# Patient Record
(demographics unavailable — no encounter records)

---

## 2019-01-11 NOTE — PREOPHP
DATE OF ADMISSION: 2019

 

HISTORY OF PRESENT ILLNESS:  Ms. Kavitha Rosales is a 37-year-old  5, para 

4, EDC 2019, intrauterine pregnancy at 36 weeks and 5 days gestational 

age, was sent from clinic today for evaluation of cholestasis of pregnancy.  The

patient has been complaining of generalized itching for the past 1 week with 

bile acid of 17.9.  Her prenatal care took place at [facility].

 

PAST MEDICAL HISTORY:  None.

 

MEDICATIONS:  Prenatal vitamins.

 

PAST SURGICAL HISTORY:  None.

 

OBSTETRICAL HISTORY:  x4 vaginal delivery.

 

GYNECOLOGIC HISTORY:  12, regular 3 to 4 days.  Denies any sexually transmitted 

disease.  Sexually active with 1 partner.

 

SOCIAL HISTORY:  Denies any smoking, drugs or alcohol.

 

FAMILY HISTORY:  None.

 

REVIEW OF SYSTEMS:  All within normal except history of present illness.

 

PHYSICAL EXAMINATION:

HEENT:  Within normal.

LUNGS:  CTA bilateral.

CARDIOVASCULAR:  S1, S2.  Regular rate, rhythm.

ABDOMEN:  Gravid, nontender.  Negative CVA bilateral.

EXTREMITIES:  Negative.  No calf tenderness.

PELVIC:  Vaginal exam is 2 cm dilated, 30% effaced, -3 station.  Fetal heart 

tracing category 1.  [place]:  Occasional contractions.  Estimated fetal weight is 

2466 grams.

 

ASSESSMENT:  Intrauterine pregnancy at 36 weeks and 5 days' gestational age with

cholestasis of pregnancy.

 

PLAN:  After discussing the above evaluation with Dr. Valenzuela, agrees to the 

delivery the patient.  recommends to keep pregnant until 37 weeks and deliver, 

unless she has elevated liver enzymes



fu liver enzymes 

 

 

Dictated By: MIRIAN GARVEY/OLAMIDE

DD:    2019 19:20:23

DT:    2019 19:34:52

Conf#: [id number]

DID#:  [id number]

 

MTDJAH

## 2019-01-12 NOTE — PN
Date/Time of Note


Date/Time of Note


DATE: 19 


TIME: 16:36





OB Subjective


Subjective


Subjective


Late entry note.


I was called at 8 AM to evaluate the patient requested by primary OB attending 


who was away due to vaginal bleeding.


Attended to the patient bedside.  Noted to have 150 cc q. BL on the chalks. 


Patient comfortable denies any symptoms.


Patient undergoing induction of labor due to cholestasis of pregnancy.


Tracing reviewed and contractions every 1 minute noted on the monitor 


questionable for possible abruption.





OB Objective


Objective


Objective


General appearance: Alert and oriented x4 does not appear to be in any acute 


distress





Abdomen: Soft, fundus consistent with gestational age





Sterile vaginal examination: 2 cm/70/-3\dark old blood noted small amount





About 120 cc of blood noted and the pads collected by RN previously





Tracing: Category 1\





OB  Assessment/Plan


Other Assessment:


IUP at 36 weeks and 6 days


Undergoing induction due to cholestasis of pregnancy, 


In early labor


Vaginal bleeding, rule out abruption, .  Tracing reassuring


We will continue to monitor closely





Due to gestational age less than 37 weeks and requirement for induction due to 


congestive pregnancy, will start  steroid while induction is in process


If undelivered consider giving the second dose after 12 hours


Continue expectant management


Follow-up in labor curve





Plan of care discussed with perinatologist, Edison Crowder who agreed with the plan.











DOMINIC BENITEZ MD              2019 16:39

## 2019-01-12 NOTE — LDN
Date/Time of Note


Date/Time of Note


DATE: 1/12/19 


TIME: 16:31





Delivery Summary


01/12/2019


AROM done prer request of Primary OB attending who manged the patient. Clear 


fluid noted. Patient shortly after had Precipitous delivery


called emergently to the Room due to significant maternal pushing, Baby was out 


by the time arrived and noted to be on the bed and nurses were suctioning the 


mouth and nose.


Cord was clamped and cut. Baby was transferred to the warmer. 


Placenta delivered complete.  Placenta evaluated and appeared to be intact.  


Fundus was firm at the end of the delivery.   cc.  Perineum evaluated no 


evidence of perineal laceration noted.  Placenta was sent to pathology


Weeks of Gestation


36 weeks and 6 days


Placenta Delivered:  Spontaneously


Meconium:  none


Episiotomy:  No


Indication for episiotomy


N/A


Perineal laceration:  0


Laceration repair:  


No laceration noted


Anesthesia type:  Epidural


Estimated blood loss:  200


Sponge & Needle done & correct:  Yes


All needle counts correct:  Yes


Any foreign bodies felt in the:  No





Infant Delivery Information


Sex


Infant Sex:  female





Apgars


1 Minute:  9


5 Minute:  9





Suctioning


Nose & mouth suctioned at abelino:  Yes


Delee suction performed:  Yes





Umbilical Cord


Umbilical cord with:  3 Vessels


Cord presentations:  no nuchal cord


Cord Blood was obtained:  Yes











DOMINIC BENITEZ MD              Jan 12, 2019 16:35

## 2019-01-12 NOTE — QN
Documentation


Comment


delivery note 


a viable female





apgar 9/9


weigth pending


placenta via cct intact


no laceration 


ebl 200 ml


no complication 


count correct











MIRIAN WINSLOW MD           2019 16:49

## 2019-01-13 NOTE — PD.PPDC
OB/GYN Discharge Instruction


Condition


                 [id number]
Patient Condition:  [id number]
Good








Diet


                [id number]
Diet:  Jfmpq6w
Resume Regular Diet








Activity/Restrictions


              [id number]
Activity:      Eexhe8g
Normal Activity


                                        May Shower


              [id number]
Restrictions:  Htxui4d
No Exercising


                                        No Lifting


                                        No Driving


                                        No Sexual Activity


                                        Nothing in the Vagina


                                        No [place]


                                        No Tampons, douche








Follow-up


Follow-up with Physician:  3, Week/Weeks





Return to clinic for


      Dgnso7Xc
GYN Instructions:       Rcsvg9r
Fever greater than 101


                                         Chills


                                         Worsening abdominal pain


                                         Excessive Vaginal Bleeding


                                         More than 2 pads per hour


                                         Unable to tolerate diet


      [id number]
OB Instructions:        Msfto9p
Breast Tenderness


                                         Depression


                                         Blurried Vision


                                         Headache


      Scujp5Gh
Surgical Instructions:  Jweqv5l
Incisional Drainage


                                         Incisional Redness














MIRIAN WINSLOW MD           Jan 13, 2019 10:41

## 2019-01-31 NOTE — DS
Date/Time of Note


Date/Time of Note


DATE: 19 


TIME: 17:49





Obstetrical Discharge Record


Final Diagnosis


Final Diagnosis:   delivered


Other Final Diagnosis


cholestasis of pregnancy, ptl





Vaginal Delivery


Obstetrical Delivery:  Spontaneous





Complications


Other (cholestasis of pregnancy)





Condition on Discharge


Physical Assessment


Last Vitals:


stable afebrile


Voiding:  Yes


Bowel Movement:  Yes


Breast:  Soft, non-tender, Filling


Fundus:  Firm


Calf Tenderness:  No


Patient Condition:  Fair











MIRIAN WINSLOW MD           2019 17:50